# Patient Record
Sex: MALE | Race: WHITE | Employment: STUDENT | URBAN - NONMETROPOLITAN AREA
[De-identification: names, ages, dates, MRNs, and addresses within clinical notes are randomized per-mention and may not be internally consistent; named-entity substitution may affect disease eponyms.]

---
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
## 2021-02-08 ENCOUNTER — OFFICE VISIT (OUTPATIENT)
Dept: ORTHOPEDIC SURGERY | Age: 21
End: 2021-02-08
Payer: COMMERCIAL

## 2021-02-08 VITALS
SYSTOLIC BLOOD PRESSURE: 129 MMHG | BODY MASS INDEX: 35.7 KG/M2 | HEART RATE: 103 BPM | TEMPERATURE: 96.6 F | HEIGHT: 71 IN | WEIGHT: 255 LBS | DIASTOLIC BLOOD PRESSURE: 85 MMHG

## 2021-02-08 DIAGNOSIS — L05.01 PILONIDAL CYST WITH ABSCESS: Primary | ICD-10-CM

## 2021-02-08 PROCEDURE — 99204 OFFICE O/P NEW MOD 45 MIN: CPT | Performed by: EMERGENCY MEDICINE

## 2021-02-08 PROCEDURE — 10060 I&D ABSCESS SIMPLE/SINGLE: CPT | Performed by: EMERGENCY MEDICINE

## 2021-02-08 RX ORDER — ESCITALOPRAM OXALATE 20 MG/1
1 TABLET ORAL DAILY
COMMUNITY
Start: 2020-04-19

## 2021-02-08 RX ORDER — ARIPIPRAZOLE 5 MG/1
5 TABLET ORAL DAILY
COMMUNITY
Start: 2021-01-06

## 2021-02-08 RX ORDER — BUSPIRONE HYDROCHLORIDE 15 MG/1
1 TABLET ORAL 2 TIMES DAILY
COMMUNITY
Start: 2020-04-19

## 2021-02-08 RX ORDER — METHYLPHENIDATE HYDROCHLORIDE 36 MG/1
72 TABLET ORAL EVERY MORNING
COMMUNITY

## 2021-02-08 RX ORDER — ARIPIPRAZOLE 10 MG/1
1 TABLET ORAL
COMMUNITY
Start: 2020-05-22

## 2021-02-08 RX ORDER — CLINDAMYCIN HYDROCHLORIDE 300 MG/1
300 CAPSULE ORAL 3 TIMES DAILY
Qty: 21 CAPSULE | Refills: 0 | Status: SHIPPED | OUTPATIENT
Start: 2021-02-08 | End: 2021-02-15

## 2021-02-08 ASSESSMENT — ENCOUNTER SYMPTOMS
ABDOMINAL PAIN: 0
SHORTNESS OF BREATH: 0
COUGH: 0
RHINORRHEA: 0

## 2021-02-08 NOTE — PATIENT INSTRUCTIONS
Patient Education        Pilonidal Abscess: Care Instructions  Your Care Instructions     A pilonidal abscess is an infection caused by an ingrown hair. The abscess occurs in the area of the tailbone and the top of the buttocks. The infection causes a pocket of pus to form. It can be quite painful. Your doctor may have opened and drained the abscess. You can take care of yourself at home to help the area heal. In some cases, the abscess returns. Your doctor may suggest surgery to remove the site of the infection if it comes back. You may have had a sedative to help you relax. You may be unsteady after having sedation. It can take a few hours for the medicine's effects to wear off. Common side effects of sedation include nausea, vomiting, and feeling sleepy or tired. The doctor has checked you carefully, but problems can develop later. If you notice any problems or new symptoms, get medical treatment right away. Follow-up care is a key part of your treatment and safety. Be sure to make and go to all appointments, and call your doctor if you are having problems. It's also a good idea to know your test results and keep a list of the medicines you take. How can you care for yourself at home? · If the doctor gave you a sedative:  ? For 24 hours, don't do anything that requires attention to detail. This includes going to work, making important decisions, or signing any legal documents. It takes time for the medicine's effects to completely wear off.  ? For your safety, do not drive or operate any machinery that could be dangerous. Wait until the medicine wears off and you can think clearly and react easily. · If your doctor prescribed antibiotics, take them exactly as directed. Do not stop taking them just because you feel better. You need to take the full course of antibiotics. · Be safe with medicines. Take pain medicines exactly as directed. ? If the doctor gave you a prescription medicine for pain, take it as prescribed. ? If you are not taking a prescription pain medicine, ask your doctor if you can take an over-the-counter medicine. · If your doctor opened and drained your abscess, you may have gauze or other packing material inside your wound. Follow all instructions from your doctor on how to care for your wound. · Keep the area of your wound very clean. Use wet cotton balls, a warm washcloth, or baby wipes. Clean the area gently, especially after a bowel movement. When should you call for help? Call 911 anytime you think you may need emergency care. For example, call if:    · You have trouble breathing.     · You passed out (lost consciousness). Call your doctor now or seek immediate medical care if:    · You have new or worse nausea or vomiting.     · You have symptoms of infection, such as:  ? Increased pain, swelling, warmth, or redness. ? Red streaks leading from the area. ? Pus draining from the area. ? A fever. Watch closely for changes in your health, and be sure to contact your doctor if:    · You do not get better as expected. Where can you learn more? Go to https://[employer]". org and sign in to your [employer] account. Enter X194 in the [facility] box to learn more about \"Pilonidal Abscess: Care Instructions. \"     If you do not have an account, please click on the \"Sign Up Now\" link. Current as of: July 2, 2020               Content Version: 12.6  © 2006-2020 [employer]. Care instructions adapted under license by [facility] ([facility]). If you have questions about a medical condition or this instruction, always ask your healthcare professional. [place] [address] any warranty or liability for your use of this information.

## 2021-02-08 NOTE — PROGRESS NOTES
NEW PATIENT VISIT  CC: Hip Pain (Abcess buttocks area)    Referring Provider: No ref. provider found    HPI:    Britney Sullivan is a 21 y.o. male who presents for evaluation of an abscess. He reported he has had this once before but it completely resolved on its own. He reports that this has been ongoing over the last several days. It began to drain extensively yesterday. He reports that this is foul-smelling. He does report that it causes some pain to walk. No fevers. Normal bowel movements. No other complaints. Past Medical History:   Diagnosis Date    ADHD     Depression        Social History  Lives at home with mom and dad    Medications  Current Outpatient Medications   Medication Sig Dispense Refill    clindamycin (CLEOCIN) 300 MG capsule Take 1 capsule by mouth 3 times daily for 7 days 21 capsule 0    ARIPiprazole (ABILIFY) 10 MG tablet Take 1 tablet by mouth      busPIRone (BUSPAR) 15 MG tablet Take 1 tablet by mouth 2 times daily      escitalopram (LEXAPRO) 20 MG tablet Take 1 tablet by mouth daily      ARIPiprazole (ABILIFY) 5 MG tablet Take 5 mg by mouth daily      methylphenidate (CONCERTA) 36 MG extended release tablet Take 72 mg by mouth every morning. No current facility-administered medications for this visit. Allergies  No Known Allergies    Review of Systems:  Review of Systems   Constitutional: Negative for chills and fever. HENT: Negative for congestion and rhinorrhea. Respiratory: Negative for cough and shortness of breath. Cardiovascular: Negative for chest pain. Gastrointestinal: Negative for abdominal pain. Musculoskeletal: Negative for joint swelling and myalgias. Skin: Negative for rash. Buttock abscess   Neurological: Negative for dizziness and light-headedness.        Physical Examination:  General: Well appearing male, in no acute distress  Respiratory: Normal respiratory effort  Cardiovascular: No visual or palpable edema Skin: Pilonidal cyst noted at the gluteal cleft, no surrounding erythema or induration, central area of fluctuance, not actively draining  Neurologic: Light touch sensation is intact, no allodynia or hyperalgesia  Gait: Normal gait and station  Extremities: No evidence of clubbing, cyanosis, tenosynovitis or nail pitting  MSK:  No gross deformities, moves all 4 extremities normally    Radiology: No new imaging    Assessment/Treatment Plan: Magno Shaffer is a 21 y.o. male with:    1. Pilonidal cyst with abscess  -     Ambulatory referral to General Surgery  -     [id number] - HI DRAIN SKIN ABSCESS SIMPLE    [address] [facility] presentation was consistent with pilonidal cyst with abscess  Spontaneously ended yesterday at home  Incision and image attempted in the office today, see procedure note listed below  Given that this is patient, commended referral to general surgery  Spoke with Dr. Erica Davis, general surgery, who plans to see the patient in his office in 1 week  Clindamycin prescribed  Topical triple antibiotic ointment  Sits baths  Strict return precautions  Follow-up as needed      Procedures  Incision and Drainage  Indication: Pilonidal cyst with abscess  Skin was draped and prepped in a sterile fashion  Alcohol prep  Ethyl chloride spray  Local infiltration of 1ml 1% lidocaine into the area of greatest fluctuance  Anesthesia successful  11 blade stab incision  No purulent drainage, sanguinous drainage  Gauze dressing placed  No complications, patient tolerated the procedure well      Follow-up:   Return if symptoms worsen or fail to improve. Sooner with any problems, questions, concerns, or worsening symptoms. Electronically signed by Rhiannon Bethea MD on 2/9/2021 at 9:13 AM.    Disclaimer: This note was dictated with voice recognition software. Though review and correction are routine, we apologize for any errors.

## 2021-02-09 ASSESSMENT — ENCOUNTER SYMPTOMS: ROS SKIN COMMENTS: BUTTOCK ABSCESS

## 2021-08-09 ENCOUNTER — OFFICE VISIT (OUTPATIENT)
Dept: ORTHOPEDIC SURGERY | Age: 21
End: 2021-08-09
Payer: COMMERCIAL

## 2021-08-09 VITALS — BODY MASS INDEX: 35.7 KG/M2 | WEIGHT: 255 LBS | TEMPERATURE: 97 F | HEIGHT: 71 IN

## 2021-08-09 DIAGNOSIS — R50.9 FEVER, UNSPECIFIED FEVER CAUSE: Primary | ICD-10-CM

## 2021-08-09 DIAGNOSIS — R05.9 COUGH: ICD-10-CM

## 2021-08-09 DIAGNOSIS — J02.9 SORE THROAT: ICD-10-CM

## 2021-08-09 PROCEDURE — 99213 OFFICE O/P EST LOW 20 MIN: CPT | Performed by: EMERGENCY MEDICINE

## 2021-08-09 RX ORDER — AMOXICILLIN 500 MG/1
CAPSULE ORAL
COMMUNITY
Start: 2021-08-02

## 2021-08-09 ASSESSMENT — ENCOUNTER SYMPTOMS
COUGH: 1
ABDOMINAL PAIN: 0
SHORTNESS OF BREATH: 0
RHINORRHEA: 0
SORE THROAT: 1

## 2021-08-09 NOTE — PROGRESS NOTES
NEW PATIENT VISIT  CC: Pharyngitis (Sore throat)    Referring Provider: No ref. provider found    HPI:    Freeda Fothergill is a 24 y.o. male who presents for evaluation of, sore throat, and a cough. The patient reports onset of symptoms 3 to 4 days ago. He had a root canal in 2020. He has had some right lower tooth pain. He was seen by the dentist and placed on 500 mg amoxicillin 3 times daily. He has been taking that as prescribed. Over the last several days, he reports a fever with a T-max of 100.9 °F, nasal congestion, sore throat, and a nonproductive cough. He has been taking combined Tylenol and ibuprofen as well as Mucinex. Mild generalized body aches. No headaches. No ear pain. He does report a sore throat but no exudate. No difficulty swallowing or difficulty breathing. No other complaints. He has never been diagnosed with COVID-19. He is vaccinated against COVID-19. He is not vaccinated against influenza. Past Medical History:   Diagnosis Date    ADHD     Depression        Medications  Current Outpatient Medications   Medication Sig Dispense Refill    amoxicillin (AMOXIL) 500 MG capsule       ARIPiprazole (ABILIFY) 10 MG tablet Take 1 tablet by mouth      ARIPiprazole (ABILIFY) 5 MG tablet Take 5 mg by mouth daily      busPIRone (BUSPAR) 15 MG tablet Take 1 tablet by mouth 2 times daily      escitalopram (LEXAPRO) 20 MG tablet Take 1 tablet by mouth daily      methylphenidate (CONCERTA) 36 MG extended release tablet Take 72 mg by mouth every morning. No current facility-administered medications for this visit. Allergies  No Known Allergies    Review of Systems:  Review of Systems   Constitutional: Positive for fever. Negative for chills. HENT: Positive for congestion and sore throat. Negative for ear pain and rhinorrhea. Respiratory: Positive for cough. Negative for shortness of breath. Cardiovascular: Negative for chest pain.    Gastrointestinal: Negative for abdominal pain. Musculoskeletal: Negative for joint swelling and myalgias. Skin: Negative for rash. Neurological: Negative for dizziness and light-headedness. Hematological: Negative for adenopathy. Physical Examination:  Vitals:    08/09/21 1135   Temp: 97 °F (36.1 °C)     General: Well appearing male, in no acute distress  HEENT: External auditory canals clear, left TM with a serous effusion but no purulent effusion or bulging tympanic membrane, right TM normal, boggy nasal mucosa, posterior pharynx mildly erythematous without any tonsillar exudate  Respiratory: No acute respiratory distress, lungs clear to auscultation bilaterally without any wheezes  Cardiovascular: Heart regular rate and rhythm without any murmurs  Skin: no identified rashes, no induration, erythema or cyanosis  Neurologic: Awake and alert, no focal neurologic deficits  Gait: Normal gait and station  Extremities: No evidence of clubbing, cyanosis, tenosynovitis or nail pitting  MSK: No gross deformities  Lymph: No anterior cervical lymphadenopathy    Radiology: none    Assessment/Treatment Plan: Fran Mendoza is a 24 y.o. male with:    1. Fever, unspecified fever cause  -     CBC WITH AUTO DIFFERENTIAL; Future  -     MONONUCLEOSIS SCREEN; Future  -     Covid-19 Ambulatory; Future  -     RAPID INFLUENZA A/B ANTIGENS; Future  -     STREP SCREEN GROUP A THROAT; Future  -     XR CHEST STANDARD (2 VW); Future  2. Cough  -     CBC WITH AUTO DIFFERENTIAL; Future  -     MONONUCLEOSIS SCREEN; Future  -     Covid-19 Ambulatory; Future  -     RAPID INFLUENZA A/B ANTIGENS; Future  -     STREP SCREEN GROUP A THROAT; Future  -     XR CHEST STANDARD (2 VW); Future  3. Sore throat    Max was seen and evaluated in the clinic today. He is presenting with fever, sore throat, and nonproductive cough. He reports several days of symptoms. He is currently on amoxicillin. He is taking this as prescribed. Physical exam is quite reassuring today.   I do believe that this may be of viral etiology. However, the amoxicillin will cover any bacterial etiology, although I do believe that this is far less likely. He is vaccinated against COVID-19. He is not vaccinated against influenza. Posterior pharynx is mildly erythematous but no exudate. Lungs are clear to auscultation bilaterally. At this time, I ordered lab work and a chest x-ray. We discussed symptom control at home. I will review the results with the patient. Patient and mother are in agreement with this plan. We did discuss strict return precautions. Follow-up:   Return if symptoms worsen or fail to improve. Sooner with any problems, questions, concerns, or worsening symptoms. Electronically signed by Filomena Hammans, MD on 8/9/2021 at 12:03 PM.    Disclaimer: This note was dictated with voice recognition software. Though review and correction are routine, we apologize for any errors.